# Patient Record
Sex: MALE | Race: WHITE | URBAN - METROPOLITAN AREA
[De-identification: names, ages, dates, MRNs, and addresses within clinical notes are randomized per-mention and may not be internally consistent; named-entity substitution may affect disease eponyms.]

---

## 2022-12-29 ENCOUNTER — WEB ENCOUNTER (OUTPATIENT)
Dept: URBAN - METROPOLITAN AREA CLINIC 54 | Facility: CLINIC | Age: 27
End: 2022-12-29

## 2022-12-29 ENCOUNTER — OFFICE VISIT (OUTPATIENT)
Dept: URBAN - METROPOLITAN AREA CLINIC 54 | Facility: CLINIC | Age: 27
End: 2022-12-29
Payer: COMMERCIAL

## 2022-12-29 VITALS
HEIGHT: 70 IN | HEART RATE: 64 BPM | BODY MASS INDEX: 28.37 KG/M2 | SYSTOLIC BLOOD PRESSURE: 164 MMHG | DIASTOLIC BLOOD PRESSURE: 107 MMHG | TEMPERATURE: 97.8 F | WEIGHT: 198.2 LBS

## 2022-12-29 DIAGNOSIS — R11.2 NAUSEA AND VOMITING, UNSPECIFIED VOMITING TYPE: ICD-10-CM

## 2022-12-29 DIAGNOSIS — R10.13 EPIGASTRIC PAIN: ICD-10-CM

## 2022-12-29 DIAGNOSIS — R03.0 ELEVATED BLOOD PRESSURE READING IN OFFICE WITHOUT DIAGNOSIS OF HYPERTENSION: ICD-10-CM

## 2022-12-29 DIAGNOSIS — R74.8 ELEVATED LIVER ENZYMES: ICD-10-CM

## 2022-12-29 PROCEDURE — 99203 OFFICE O/P NEW LOW 30 MIN: CPT

## 2022-12-29 RX ORDER — DICYCLOMINE HYDROCHLORIDE 20 MG/1
1 TABLET TABLET ORAL
Qty: 90 | Refills: 0 | OUTPATIENT
Start: 2022-12-29 | End: 2023-01-28

## 2022-12-29 RX ORDER — OMEPRAZOLE 20 MG/1
1 CAPSULE 30 MINUTES BEFORE MORNING MEAL CAPSULE, DELAYED RELEASE ORAL ONCE A DAY
Qty: 30 | Refills: 1 | OUTPATIENT
Start: 2022-12-29

## 2022-12-29 NOTE — HPI-TODAY'S VISIT:
12/29/22: Pt is a healthy 28 yo male who presents complaining of episodic epigastric pain over the last year prompting multiple ER visits. Pt states he's had episodes in April, November, and now consisting of squeezing, pressure like epigastric pain followed by nausea and vomiting / dry heaving that is so severe he goes to the ER. Usually the symptoms progression occurs within a day, but this time pt says his pain started on Monday and the vomiting started yesterday. Pain is unrelated to eating, is worse at rest, and improves with activity and exercise. It is rated a 7/10 in severity. He avoid greasy/fatty foods. Denies hematemesis or coffee ground emesis, but has noticed it in the past. Denies unintentional weight loss, appetite is okay overall, BMs are regular but get looser during episodes. Denies NSAID use. Drinks etoh socially. Denies tobacco. Edible marijuana occasionally. Pt had EGD 12 years ago with acute erosive gastritis. On Epic review he had mild leukocytosis at both ED visits and elevated TB at most recent one. No imaging was done.  April 2022 ED w/u: WBC 11.5, TB 0.6, AST 22, ALT 34, ALP 86. Normal lipase. No imaging. Nov 2022 ED w/u: WBC 16.1, TB 1.7, AST 36, ALT 41, ALP 77. Normal lipase. No imaging.

## 2022-12-30 LAB
A/G RATIO: 1.8
ABSOLUTE BASOPHILS: 62
ABSOLUTE EOSINOPHILS: 322
ABSOLUTE LYMPHOCYTES: 1550
ABSOLUTE MONOCYTES: 502
ABSOLUTE NEUTROPHILS: 3763
ALBUMIN: 5
ALKALINE PHOSPHATASE: 66
ALT (SGPT): 58
AST (SGOT): 43
BASOPHILS: 1
BILIRUBIN, TOTAL: 0.9
BUN/CREATININE RATIO: (no result)
BUN: 13
CALCIUM: 10.2
CARBON DIOXIDE, TOTAL: 27
CHLORIDE: 101
CREATININE: 1.17
EGFR: 88
EOSINOPHILS: 5.2
GLOBULIN, TOTAL: 2.8
GLUCOSE: 88
H PYLORI BREATH TEST: DETECTED
H. PYLORI BREATH TEST: DETECTED
HEMATOCRIT: 46.6
HEMOGLOBIN: 16.3
INTERPRETATION: DETECTED
LYMPHOCYTES: 25
MCH: 31.2
MCHC: 35
MCV: 89.3
MONOCYTES: 8.1
MPV: 10.5
NEUTROPHILS: 60.7
PLATELET COUNT: 306
POTASSIUM: 4.2
PROTEIN, TOTAL: 7.8
RDW: 12.1
RED BLOOD CELL COUNT: 5.22
SODIUM: 139
WHITE BLOOD CELL COUNT: 6.2

## 2023-01-03 ENCOUNTER — TELEPHONE ENCOUNTER (OUTPATIENT)
Dept: URBAN - METROPOLITAN AREA CLINIC 54 | Facility: CLINIC | Age: 28
End: 2023-01-03

## 2023-01-03 RX ORDER — CLARITHROMYCIN 500 MG/1
1 TABLET TABLET, FILM COATED ORAL TWICE A DAY
Qty: 28 TABLET | Refills: 0 | OUTPATIENT
Start: 2023-01-03 | End: 2023-01-17

## 2023-01-03 RX ORDER — AMOXICILLIN 500 MG/1
2 CAPSULES CAPSULE ORAL TWICE A DAY
Qty: 56 CAPSULE | Refills: 0 | OUTPATIENT
Start: 2023-01-03 | End: 2023-01-17

## 2023-02-14 ENCOUNTER — LAB OUTSIDE AN ENCOUNTER (OUTPATIENT)
Dept: URBAN - METROPOLITAN AREA CLINIC 54 | Facility: CLINIC | Age: 28
End: 2023-02-14

## 2023-03-08 ENCOUNTER — ERX REFILL RESPONSE (OUTPATIENT)
Dept: URBAN - METROPOLITAN AREA CLINIC 54 | Facility: CLINIC | Age: 28
End: 2023-03-08

## 2023-03-08 RX ORDER — OMEPRAZOLE 20 MG/1
1 CAPSULE 30 MINUTES BEFORE MORNING MEAL CAPSULE, DELAYED RELEASE ORAL ONCE A DAY
Qty: 30 | Refills: 1 | OUTPATIENT

## 2023-03-08 RX ORDER — OMEPRAZOLE 20 MG/1
TAKE 1 CAPSULE BY MOUTH 30 MINUTES BEFORE MORNING MEAL EVERY DAY FOR 30 DAYS CAPSULE, DELAYED RELEASE ORAL
Qty: 30 CAPSULE | Refills: 1 | OUTPATIENT

## 2023-03-17 ENCOUNTER — OFFICE VISIT (OUTPATIENT)
Dept: URBAN - METROPOLITAN AREA CLINIC 54 | Facility: CLINIC | Age: 28
End: 2023-03-17

## 2023-03-23 ENCOUNTER — DASHBOARD ENCOUNTERS (OUTPATIENT)
Age: 28
End: 2023-03-23

## 2023-03-23 ENCOUNTER — OFFICE VISIT (OUTPATIENT)
Dept: URBAN - METROPOLITAN AREA CLINIC 54 | Facility: CLINIC | Age: 28
End: 2023-03-23
Payer: COMMERCIAL

## 2023-03-23 VITALS
HEART RATE: 60 BPM | SYSTOLIC BLOOD PRESSURE: 134 MMHG | TEMPERATURE: 97.4 F | HEIGHT: 70 IN | DIASTOLIC BLOOD PRESSURE: 88 MMHG | BODY MASS INDEX: 28.92 KG/M2 | WEIGHT: 202 LBS

## 2023-03-23 DIAGNOSIS — A04.8 H. PYLORI INFECTION: ICD-10-CM

## 2023-03-23 DIAGNOSIS — R74.8 ELEVATED LIVER ENZYMES: ICD-10-CM

## 2023-03-23 PROCEDURE — 99213 OFFICE O/P EST LOW 20 MIN: CPT | Performed by: INTERNAL MEDICINE

## 2023-03-23 NOTE — HPI-TODAY'S VISIT:
12/29/22: Pt is a healthy 26 yo male who presents complaining of episodic epigastric pain over the last year prompting multiple ER visits. Pt states he's had episodes in April, November, and now consisting of squeezing, pressure like epigastric pain followed by nausea and vomiting / dry heaving that is so severe he goes to the ER. Usually the symptoms progression occurs within a day, but this time pt says his pain started on Monday and the vomiting started yesterday. Pain is unrelated to eating, is worse at rest, and improves with activity and exercise. It is rated a 7/10 in severity. He avoid greasy/fatty foods. Denies hematemesis or coffee ground emesis, but has noticed it in the past. Denies unintentional weight loss, appetite is okay overall, BMs are regular but get looser during episodes. Denies NSAID use. Drinks etoh socially. Denies tobacco. Edible marijuana occasionally. Pt had EGD 12 years ago with acute erosive gastritis. On Epic review he had mild leukocytosis at both ED visits and elevated TB at most recent one. No imaging was done.  April 2022 ED w/u: WBC 11.5, TB 0.6, AST 22, ALT 34, ALP 86. Normal lipase. No imaging. Nov 2022 ED w/u: WBC 16.1, TB 1.7, AST 36, ALT 41, ALP 77. Normal lipase. No imaging.  3/23/23: Pt presents for follow up. Positive for H pylori on breath test. Completed triple therapy with amox/clarithro/omeprazole and symptoms have resolved. Not having any abdominal pain. Occassionally has early satiety. No other complaints. RUQ was normal. Labs showed mildly elevated transaminases, previously normal.